# Patient Record
Sex: MALE | Race: WHITE | NOT HISPANIC OR LATINO | Employment: FULL TIME | URBAN - METROPOLITAN AREA
[De-identification: names, ages, dates, MRNs, and addresses within clinical notes are randomized per-mention and may not be internally consistent; named-entity substitution may affect disease eponyms.]

---

## 2017-08-21 ENCOUNTER — HOSPITAL ENCOUNTER (OUTPATIENT)
Dept: LAB | Facility: HOSPITAL | Age: 27
Discharge: HOME | End: 2017-08-21
Attending: FAMILY MEDICINE | Admitting: FAMILY MEDICINE

## 2017-08-21 LAB
ALBUMIN SERPL-MCNC: 4.2 G/DL (ref 3.5–4.8)
ALBUMIN/GLOB SERPL: 1.7 {RATIO} (ref 1–1.7)
ALP SERPL-CCNC: 56 IU/L (ref 32–91)
ALT SERPL-CCNC: 26 IU/L (ref 17–63)
ANION GAP SERPL CALC-SCNC: 12.2 MMOL/L (ref 10–20)
AST SERPL-CCNC: 25 IU/L (ref 15–41)
BASOPHILS # BLD AUTO: 0.1 10*3/UL (ref 0–0.2)
BASOPHILS NFR BLD AUTO: 1 % (ref 0–2)
BILIRUB SERPL-MCNC: 0.5 MG/DL (ref 0.3–1.2)
BUN SERPL-MCNC: 14 MG/DL (ref 8–20)
BUN/CREAT SERPL: 17.5 (ref 6.2–20.3)
CALCIUM SERPL-MCNC: 8.9 MG/DL (ref 8.9–10.3)
CHLORIDE SERPL-SCNC: 106 MMOL/L (ref 101–111)
CONV CO2: 28 MMOL/L (ref 22–32)
CONV TOTAL PROTEIN: 6.7 G/DL (ref 6.1–7.9)
CREAT UR-MCNC: 0.8 MG/DL (ref 0.7–1.2)
DIFFERENTIAL METHOD BLD: (no result)
EOSINOPHIL # BLD AUTO: 0.2 10*3/UL (ref 0–0.3)
EOSINOPHIL # BLD AUTO: 2 % (ref 0–3)
ERYTHROCYTE [DISTWIDTH] IN BLOOD BY AUTOMATED COUNT: 14.2 % (ref 11.5–14.5)
GLOBULIN UR ELPH-MCNC: 2.5 G/DL (ref 2.5–3.8)
GLUCOSE SERPL-MCNC: 88 MG/DL (ref 65–99)
HCT VFR BLD AUTO: 43.1 % (ref 40–54)
HGB BLD-MCNC: 14.6 G/DL (ref 14–18)
LYMPHOCYTES # BLD AUTO: 2 10*3/UL (ref 0.8–4.8)
LYMPHOCYTES NFR BLD AUTO: 28 % (ref 18–42)
MCH RBC QN AUTO: 30.1 PG (ref 26–32)
MCHC RBC AUTO-ENTMCNC: 33.9 G/DL (ref 32–36)
MCV RBC AUTO: 88.6 FL (ref 80–94)
MONOCYTES # BLD AUTO: 0.5 10*3/UL (ref 0.1–1.3)
MONOCYTES NFR BLD AUTO: 7 % (ref 2–11)
NEUTROPHILS # BLD AUTO: 4.6 10*3/UL (ref 2.3–8.6)
NEUTROPHILS NFR BLD AUTO: 62 % (ref 50–75)
NRBC BLD AUTO-RTO: 0 /100{WBCS}
NRBC/RBC NFR BLD MANUAL: 0 10*3/UL
PLATELET # BLD AUTO: 148 10*3/UL (ref 150–450)
PMV BLD AUTO: 10.6 FL (ref 7.4–10.4)
POTASSIUM SERPL-SCNC: 4.2 MMOL/L (ref 3.6–5.1)
RBC # BLD AUTO: 4.87 10*6/UL (ref 4.6–6)
SODIUM SERPL-SCNC: 142 MMOL/L (ref 136–144)
T3 SERPL-MCNC: 1.06 NG/ML (ref 0.87–1.78)
T4 SERPL-MCNC: 6.41 UG/DL (ref 6.1–12.2)
TSH SERPL-ACNC: 0.76 UIU/ML (ref 0.34–5.6)
WBC # BLD AUTO: 7.3 10*3/UL (ref 4.5–11.5)

## 2017-08-22 LAB — T4 FREE SERPL-MCNC: 1.12 NG/DL (ref 0.58–1.64)

## 2019-01-11 ENCOUNTER — HOSPITAL ENCOUNTER (OUTPATIENT)
Dept: RHEUMATOLOGY | Facility: CLINIC | Age: 29
Discharge: HOME | End: 2019-01-11
Attending: INTERNAL MEDICINE | Admitting: INTERNAL MEDICINE

## 2019-01-16 ENCOUNTER — HOSPITAL ENCOUNTER (OUTPATIENT)
Dept: LAB | Facility: HOSPITAL | Age: 29
Discharge: HOME | End: 2019-01-16
Attending: INTERNAL MEDICINE | Admitting: INTERNAL MEDICINE

## 2019-01-16 LAB
ABS VARIANT LYMPHS: 0.24 10*3/UL
ALBUMIN SERPL-MCNC: 3.6 G/DL (ref 3.5–4.8)
ALBUMIN/GLOB SERPL: 1.1 {RATIO} (ref 1–1.7)
ALP SERPL-CCNC: 59 IU/L (ref 32–91)
ALT SERPL-CCNC: 59 IU/L (ref 17–63)
ANION GAP SERPL CALC-SCNC: 13 MMOL/L (ref 10–20)
AST SERPL-CCNC: 43 IU/L (ref 15–41)
BILIRUB SERPL-MCNC: 0.1 MG/DL (ref 0.3–1.2)
BILIRUB UR QL STRIP: NEGATIVE MG/DL
BUN SERPL-MCNC: 21 MG/DL (ref 8–20)
BUN/CREAT SERPL: 23.3 (ref 6.2–20.3)
CALCIUM SERPL-MCNC: 8.4 MG/DL (ref 8.9–10.3)
CASTS URNS QL MICRO: NORMAL /[LPF]
CHLORIDE SERPL-SCNC: 100 MMOL/L (ref 101–111)
COLOR UR: YELLOW
CONV ABS BANDS: 0.1 10*3/UL
CONV BACTERIA IN URINE MICRO: NEGATIVE
CONV CLARITY OF URINE: CLEAR
CONV CO2: 27 MMOL/L (ref 22–32)
CONV HYALINE CASTS IN URINE MICRO: NORMAL /[LPF] (ref 0–5)
CONV PROTEIN IN URINE BY AUTOMATED TEST STRIP: NEGATIVE MG/DL
CONV SMALL ROUND CELLS: NORMAL /[HPF]
CONV TOTAL PROTEIN: 6.8 G/DL (ref 6.1–7.9)
CONV UROBILINOGEN IN URINE BY AUTOMATED TEST STRIP: 0.2 MG/DL
CONV VARIANT LYMPHOCYTES RELATIVE PERCENT BY MANUAL COUNT: 3 % (ref 0–1)
CREAT UR-MCNC: 0.9 MG/DL (ref 0.7–1.2)
CRP SERPL-MCNC: 0.67 MG/DL (ref 0–0.7)
CULTURE INDICATED?: NORMAL
DIFFERENTIAL METHOD BLD: (no result)
EOSINOPHIL # BLD AUTO: 0.3 10*3/UL (ref 0–0.3)
EOSINOPHIL # BLD AUTO: 4 % (ref 0–3)
ERYTHROCYTE [DISTWIDTH] IN BLOOD BY AUTOMATED COUNT: 14.5 % (ref 11.5–14.5)
GLOBULIN UR ELPH-MCNC: 3.2 G/DL (ref 2.5–3.8)
GLUCOSE SERPL-MCNC: 89 MG/DL (ref 65–99)
GLUCOSE UR QL: NEGATIVE MG/DL
HCT VFR BLD AUTO: 44.6 % (ref 40–54)
HGB BLD-MCNC: 15.2 G/DL (ref 14–18)
HGB UR QL STRIP: NEGATIVE
KETONES UR QL STRIP: NEGATIVE MG/DL
LEUKOCYTE ESTERASE UR QL STRIP: NEGATIVE
LYMPHOCYTES # BLD AUTO: 3.2 10*3/UL (ref 0.8–4.8)
LYMPHOCYTES NFR BLD AUTO: 40 % (ref 18–42)
MCH RBC QN AUTO: 29.7 PG (ref 26–32)
MCHC RBC AUTO-ENTMCNC: 34.1 G/DL (ref 32–36)
MCV RBC AUTO: 87.2 FL (ref 80–94)
MONOCYTES # BLD AUTO: 0.7 10*3/UL (ref 0.1–1.3)
MONOCYTES NFR BLD AUTO: 9 % (ref 2–11)
NEUTROPHILS # BLD AUTO: 3.4 10*3/UL (ref 2.3–8.6)
NEUTROPHILS NFR BLD AUTO: 43 % (ref 50–75)
NEUTS BAND NFR BLD MANUAL: 1 % (ref 0–5)
NITRITE UR QL STRIP: NEGATIVE
PH UR STRIP.AUTO: 6 [PH] (ref 4.5–8)
PLATELET # BLD AUTO: 231 10*3/UL (ref 150–450)
PMV BLD AUTO: 9.2 FL (ref 7.4–10.4)
POTASSIUM SERPL-SCNC: 4 MMOL/L (ref 3.6–5.1)
RBC # BLD AUTO: 5.12 10*6/UL (ref 4.6–6)
RBC #/AREA URNS HPF: 0 /[HPF] (ref 0–3)
SODIUM SERPL-SCNC: 136 MMOL/L (ref 136–144)
SP GR UR: 1.01 (ref 1–1.03)
SPERM URNS QL MICRO: NORMAL /[HPF]
SQUAMOUS SPT QL MICRO: 1 /[HPF] (ref 0–5)
UNIDENT CRYS URNS QL MICRO: NORMAL /[HPF]
WBC # BLD AUTO: 7.9 10*3/UL (ref 4.5–11.5)
WBC #/AREA URNS HPF: 2 /[HPF] (ref 0–5)
YEAST SPEC QL WET PREP: NORMAL /[HPF]

## 2019-01-16 PROCEDURE — 86481 TB AG RESPONSE T-CELL SUSP: CPT

## 2019-01-17 ENCOUNTER — LAB REQUISITION (OUTPATIENT)
Dept: LAB | Facility: HOSPITAL | Age: 29
End: 2019-01-17

## 2019-01-17 DIAGNOSIS — Z00.00 ROUTINE GENERAL MEDICAL EXAMINATION AT A HEALTH CARE FACILITY: ICD-10-CM

## 2019-01-17 LAB
HAV IGM SERPL QL IA: NONREACTIVE
HBV CORE IGM SERPL QL IA: NONREACTIVE
HBV SURFACE AG SERPL QL IA: NONREACTIVE
HCV AB SER DONR QL: NORMAL
HCV AB SER DONR QL: NORMAL

## 2019-01-18 LAB
TSPOT INTERPRETATION: NEGATIVE
TSPOT INTERPRETATION: NORMAL
TSPOT NIL CONTROL INTERPRETATION: NORMAL
TSPOT PANEL A: 0
TSPOT PANEL B: 0
TSPOT POS CONTROL INTERPRETATION: NORMAL

## 2019-05-09 ENCOUNTER — HOSPITAL ENCOUNTER (OUTPATIENT)
Dept: INFUSION THERAPY | Facility: HOSPITAL | Age: 29
Discharge: HOME | End: 2019-05-09
Attending: ORAL & MAXILLOFACIAL SURGERY | Admitting: ORAL & MAXILLOFACIAL SURGERY

## 2019-06-27 PROBLEM — E55.9 VITAMIN D DEFICIENCY: Status: ACTIVE | Noted: 2019-02-25

## 2019-06-27 PROBLEM — L03.90 CELLULITIS: Status: ACTIVE | Noted: 2019-01-11

## 2019-06-27 PROBLEM — E03.9 HYPOTHYROIDISM: Status: ACTIVE | Noted: 2019-01-11

## 2019-06-27 PROBLEM — G56.03 CARPAL TUNNEL SYNDROME, BILATERAL: Status: ACTIVE | Noted: 2019-01-11

## 2019-06-27 PROBLEM — M06.9 RHEUMATOID ARTHRITIS OF HAND (HCC): Status: ACTIVE | Noted: 2019-01-11

## 2019-06-27 RX ORDER — LEVOTHYROXINE SODIUM 300 UG/1
TABLET ORAL
COMMUNITY
Start: 2018-11-26

## 2019-06-27 RX ORDER — ATENOLOL 100 MG/1
TABLET ORAL
COMMUNITY
Start: 2018-10-19

## 2019-06-27 RX ORDER — HYDROXYCHLOROQUINE SULFATE 200 MG/1
TABLET, FILM COATED ORAL
COMMUNITY
Start: 2019-02-28 | End: 2019-07-01 | Stop reason: SDUPTHER

## 2019-06-27 RX ORDER — MELOXICAM 15 MG/1
TABLET ORAL EVERY 24 HOURS
COMMUNITY
Start: 2019-03-30 | End: 2019-07-01

## 2019-06-27 RX ORDER — GABAPENTIN 300 MG/1
CAPSULE ORAL
COMMUNITY
Start: 2018-12-26 | End: 2021-07-01

## 2019-06-27 RX ORDER — AZITHROMYCIN 1 G
PACKET (EA) ORAL
COMMUNITY
Start: 2019-01-11 | End: 2021-07-01

## 2019-06-27 RX ORDER — LIOTHYRONINE SODIUM 25 UG/1
TABLET ORAL
COMMUNITY
Start: 2018-11-26

## 2019-07-01 ENCOUNTER — OFFICE VISIT (OUTPATIENT)
Dept: RHEUMATOLOGY | Facility: CLINIC | Age: 29
End: 2019-07-01

## 2019-07-01 ENCOUNTER — LAB (OUTPATIENT)
Dept: LAB | Facility: HOSPITAL | Age: 29
End: 2019-07-01

## 2019-07-01 ENCOUNTER — LAB REQUISITION (OUTPATIENT)
Dept: LAB | Facility: HOSPITAL | Age: 29
End: 2019-07-01

## 2019-07-01 VITALS
DIASTOLIC BLOOD PRESSURE: 80 MMHG | WEIGHT: 315 LBS | HEIGHT: 76 IN | SYSTOLIC BLOOD PRESSURE: 142 MMHG | HEART RATE: 70 BPM | BODY MASS INDEX: 38.36 KG/M2

## 2019-07-01 DIAGNOSIS — R20.0 BILATERAL HAND NUMBNESS: ICD-10-CM

## 2019-07-01 DIAGNOSIS — M06.09 RHEUMATOID ARTHRITIS OF MULTIPLE SITES WITHOUT RHEUMATOID FACTOR (HCC): ICD-10-CM

## 2019-07-01 DIAGNOSIS — E03.9 HYPOTHYROIDISM, UNSPECIFIED TYPE: ICD-10-CM

## 2019-07-01 DIAGNOSIS — M05.79 RHEUMATOID ARTHRITIS INVOLVING MULTIPLE SITES WITH POSITIVE RHEUMATOID FACTOR (HCC): ICD-10-CM

## 2019-07-01 DIAGNOSIS — E55.9 VITAMIN D DEFICIENCY: ICD-10-CM

## 2019-07-01 LAB
25(OH)D3 SERPL-MCNC: 31 NG/ML (ref 30–100)
ALBUMIN SERPL-MCNC: 4.1 G/DL (ref 3.5–4.8)
ALBUMIN/GLOB SERPL: 1.6 G/DL (ref 1–1.7)
ALP SERPL-CCNC: 51 U/L (ref 32–91)
ALT SERPL W P-5'-P-CCNC: 37 U/L (ref 17–63)
ANION GAP SERPL CALCULATED.3IONS-SCNC: 15.5 MMOL/L (ref 10–20)
AST SERPL-CCNC: 28 U/L (ref 15–41)
BASOPHILS # BLD AUTO: 0 10*3/MM3 (ref 0–0.2)
BASOPHILS NFR BLD AUTO: 0.6 % (ref 0–1.5)
BILIRUB SERPL-MCNC: 0.4 MG/DL (ref 0.3–1.2)
BUN BLD-MCNC: 15 MG/DL (ref 8–20)
BUN/CREAT SERPL: 21.4 (ref 6.2–20.3)
CALCIUM SPEC-SCNC: 8.4 MG/DL (ref 8.9–10.3)
CHLORIDE SERPL-SCNC: 103 MMOL/L (ref 101–111)
CO2 SERPL-SCNC: 25 MMOL/L (ref 22–32)
CREAT BLD-MCNC: 0.7 MG/DL (ref 0.7–1.2)
CRP SERPL-MCNC: 0.23 MG/DL (ref 0–0.7)
CYCLIC CITRULLINATED PEPTIDE ANTIBODY: < 0.4
DEPRECATED RDW RBC AUTO: 45.5 FL (ref 37–54)
EOSINOPHIL # BLD AUTO: 0.2 10*3/MM3 (ref 0–0.4)
EOSINOPHIL NFR BLD AUTO: 2.5 % (ref 0.3–6.2)
ERYTHROCYTE [DISTWIDTH] IN BLOOD BY AUTOMATED COUNT: 14.5 % (ref 12.3–15.4)
ERYTHROCYTE [SEDIMENTATION RATE] IN BLOOD: 13 MM/HR (ref 0–15)
GFR SERPL CREATININE-BSD FRML MDRD: 132 ML/MIN/1.73
GLOBULIN UR ELPH-MCNC: 2.5 GM/DL (ref 2.5–3.8)
GLUCOSE BLD-MCNC: 98 MG/DL (ref 65–99)
HCT VFR BLD AUTO: 44.4 % (ref 37.5–51)
HGB BLD-MCNC: 14.9 G/DL (ref 13–17.7)
LYMPHOCYTES # BLD AUTO: 1.8 10*3/MM3 (ref 0.7–3.1)
LYMPHOCYTES NFR BLD AUTO: 29 % (ref 19.6–45.3)
MCH RBC QN AUTO: 30.2 PG (ref 26.6–33)
MCHC RBC AUTO-ENTMCNC: 33.5 G/DL (ref 31.5–35.7)
MCV RBC AUTO: 90.3 FL (ref 79–97)
MONOCYTES # BLD AUTO: 0.4 10*3/MM3 (ref 0.1–0.9)
MONOCYTES NFR BLD AUTO: 7.1 % (ref 5–12)
NEUTROPHILS # BLD AUTO: 3.8 10*3/MM3 (ref 1.7–7)
NEUTROPHILS NFR BLD AUTO: 60.8 % (ref 42.7–76)
PLATELET # BLD AUTO: 168 10*3/MM3 (ref 140–450)
PMV BLD AUTO: 9.8 FL (ref 6–12)
POTASSIUM BLD-SCNC: 4.5 MMOL/L (ref 3.6–5.1)
PROT SERPL-MCNC: 6.6 G/DL (ref 6.1–7.9)
RBC # BLD AUTO: 4.92 10*6/MM3 (ref 4.14–5.8)
SODIUM BLD-SCNC: 139 MMOL/L (ref 136–144)
TSH SERPL DL<=0.05 MIU/L-ACNC: 11.33 MIU/ML (ref 0.34–5.6)
URATE SERPL-MCNC: 6.1 MG/DL (ref 4.8–8.7)
WBC NRBC COR # BLD: 6.2 10*3/MM3 (ref 3.4–10.8)

## 2019-07-01 PROCEDURE — 36415 COLL VENOUS BLD VENIPUNCTURE: CPT

## 2019-07-01 PROCEDURE — 85652 RBC SED RATE AUTOMATED: CPT | Performed by: NURSE PRACTITIONER

## 2019-07-01 PROCEDURE — 82306 VITAMIN D 25 HYDROXY: CPT | Performed by: NURSE PRACTITIONER

## 2019-07-01 PROCEDURE — 86200 CCP ANTIBODY: CPT | Performed by: NURSE PRACTITIONER

## 2019-07-01 PROCEDURE — 85027 COMPLETE CBC AUTOMATED: CPT | Performed by: NURSE PRACTITIONER

## 2019-07-01 PROCEDURE — 84550 ASSAY OF BLOOD/URIC ACID: CPT | Performed by: NURSE PRACTITIONER

## 2019-07-01 PROCEDURE — 99214 OFFICE O/P EST MOD 30 MIN: CPT | Performed by: NURSE PRACTITIONER

## 2019-07-01 PROCEDURE — 80053 COMPREHEN METABOLIC PANEL: CPT | Performed by: NURSE PRACTITIONER

## 2019-07-01 PROCEDURE — 84443 ASSAY THYROID STIM HORMONE: CPT | Performed by: NURSE PRACTITIONER

## 2019-07-01 PROCEDURE — 86431 RHEUMATOID FACTOR QUANT: CPT | Performed by: NURSE PRACTITIONER

## 2019-07-01 PROCEDURE — 86140 C-REACTIVE PROTEIN: CPT | Performed by: NURSE PRACTITIONER

## 2019-07-01 RX ORDER — HYDROXYCHLOROQUINE SULFATE 200 MG/1
200 TABLET, FILM COATED ORAL 2 TIMES DAILY
Qty: 180 TABLET | Refills: 1 | Status: SHIPPED | OUTPATIENT
Start: 2019-07-01 | End: 2019-07-23 | Stop reason: SDUPTHER

## 2019-07-01 RX ORDER — DICLOFENAC SODIUM 75 MG/1
75 TABLET, DELAYED RELEASE ORAL 2 TIMES DAILY
Qty: 60 TABLET | Refills: 0 | Status: SHIPPED | OUTPATIENT
Start: 2019-07-01 | End: 2019-09-06 | Stop reason: SDUPTHER

## 2019-07-01 NOTE — PROGRESS NOTES
Subjective   Santhosh Bonilla is a 28 y.o. male.     History of Present Illness  Pt is here for follow up today. He has history of seropositive RA, carpal tunnel syndrome, vitamin D deficiency, hypocalcemia and hypothyroidism. Pt was last seen in the office on 2-25-19 and is here for follow up today.  Labs in January 2019 showed a (-) t-spot, normal vitamin D level, low calcium at 8.4 (8.9-10.3), CBC showed no leukopenia, anemia or thrombocytopenia.    Last hand xray was in January 2019 & showed no evidence of inflammatory arthritis.     Current medications from Rheumatology include: plaquenil 200mg/d, meloxicam 15 mg/d...feels like meds are not helping much. Having pain and stiffness in hands, feet and shoulders. Stiffness lasts 2 hours, hands swell. Hands seem to bother him the most. The numbness is worsening (rt worse than left)he is taking vitamin B6 and reports no change or improvement in symptoms. Works with his hands all day & his ADLs can be difficult.    He is taking gabapentin 300 TID per PCP for his pain. PCP manages his thyroid disease, reports he is due for lab check.        Rheumatological history: Seropositive RA--> on plaquenil & meloxicam  Offered arava, pt did not want to try d/t concern w/ side effects.    The following portions of the patient's history were reviewed and updated as appropriate: allergies, current medications, past family history, past medical history, past social history, past surgical history and problem list.    Review of Systems   Constitutional: Negative for chills and fever.   HENT: Negative for mouth sores.    Eyes: Negative for pain.        Denies dry eyes   Respiratory: Negative for chest tightness and shortness of breath.    Cardiovascular: Negative for chest pain.   Musculoskeletal: Positive for arthralgias, back pain and joint swelling.   Skin: Negative for color change and rash.       Objective   Physical Exam   Constitutional: He is oriented to person, place, and  time.   obese   HENT:   Head: Normocephalic and atraumatic.   Eyes: Conjunctivae and EOM are normal. Pupils are equal, round, and reactive to light.   Neck: Normal range of motion.   Cardiovascular: Normal rate and regular rhythm.   Pulmonary/Chest: Effort normal and breath sounds normal. He has no wheezes.   Musculoskeletal:   Decreased ROM to the lumbar spine, bilateral hips, MCPs  He has tenderness over the bilateral MCPs, 1st CMC joint, bilateral PIPs, right great toe.   Mild swelling is noted over the bilateral 2nd/3rd MCP   Neurological: He is alert and oriented to person, place, and time.   Skin: Skin is warm and dry. No rash noted.   Psychiatric: He has a normal mood and affect. His behavior is normal.         Assessment/Plan   Santhosh was seen today for joint pain.    Diagnoses and all orders for this visit:    Rheumatoid arthritis involving multiple sites with positive rheumatoid factor (CMS/HCC)  Comments:  Increased joint pain in the hands & feet  Will repeat hand xray today, order bilateral foot xray  Increase plaquenil to 400mg/d, start diclofenac 75 BID  labs  Orders:  -     CBC With Manual Differential; Future  -     Comprehensive Metabolic Panel; Future  -     C-reactive Protein; Future  -     Cyclic Citrul Peptide Antibody, IgG / IgA; Future  -     Vitamin D 25 Hydroxy; Future  -     Sedimentation Rate; Future  -     Rheumatoid Factor; Future  -     Vectra(R) DA Disease Activity; Future  -     XR Hand 2 View Bilateral  -     XR Foot 3+ View Bilateral; Future  -     EMG 2 Limbs; Future  -     Uric Acid; Future    Bilateral hand numbness  Comments:  Continue B6  EMG of the bilateral upper ext.  Orders:  -     CBC With Manual Differential; Future  -     Comprehensive Metabolic Panel; Future  -     C-reactive Protein; Future  -     Cyclic Citrul Peptide Antibody, IgG / IgA; Future  -     Vitamin D 25 Hydroxy; Future  -     Sedimentation Rate; Future  -     Rheumatoid Factor; Future  -     Vectra(R)  DA Disease Activity; Future  -     XR Hand 2 View Bilateral  -     XR Foot 3+ View Bilateral; Future  -     EMG 2 Limbs; Future    Hypothyroidism, unspecified type  Comments:  Pt currently on levothyroxine  Labs today to include TSH--> will fax results to PCP  Orders:  -     CBC With Manual Differential; Future  -     Comprehensive Metabolic Panel; Future  -     C-reactive Protein; Future  -     Cyclic Citrul Peptide Antibody, IgG / IgA; Future  -     Vitamin D 25 Hydroxy; Future  -     Sedimentation Rate; Future  -     Rheumatoid Factor; Future  -     Vectra(R) DA Disease Activity; Future  -     XR Hand 2 View Bilateral  -     XR Foot 3+ View Bilateral; Future  -     EMG 2 Limbs; Future  -     TSH; Future    Vitamin D deficiency  Comments:  Check vitamin D level today  Orders:  -     CBC With Manual Differential; Future  -     Comprehensive Metabolic Panel; Future  -     C-reactive Protein; Future  -     Cyclic Citrul Peptide Antibody, IgG / IgA; Future  -     Vitamin D 25 Hydroxy; Future  -     Sedimentation Rate; Future  -     Rheumatoid Factor; Future  -     Vectra(R) DA Disease Activity; Future  -     XR Hand 2 View Bilateral  -     XR Foot 3+ View Bilateral; Future  -     EMG 2 Limbs; Future

## 2019-07-01 NOTE — PATIENT INSTRUCTIONS
Follow up in 3 months  Hand/ feet xray today   Labs + vectra DA today, EMG bilateral upper ext.  Stop meloxicam--> will start diclofenac 75 mg po BID as needed  Increase plaquenil to 400mg/d    Discussed the importance of eye examination with plaquenil use. Pt verbalizes understanding.

## 2019-07-03 LAB — CHROMATIN AB SERPL-ACNC: <20 IU/ML (ref 0–20)

## 2019-07-21 ENCOUNTER — TELEPHONE (OUTPATIENT)
Dept: RHEUMATOLOGY | Facility: CLINIC | Age: 29
End: 2019-07-21

## 2019-07-21 DIAGNOSIS — M05.79 RHEUMATOID ARTHRITIS INVOLVING MULTIPLE SITES WITH POSITIVE RHEUMATOID FACTOR (HCC): ICD-10-CM

## 2019-07-21 NOTE — TELEPHONE ENCOUNTER
Vectra is moderate. Would see how pt does with increase in plaquenil. It may take a month or two to see changes or improvement. If no changes in symptoms. Pt may let me know.

## 2019-07-23 RX ORDER — HYDROXYCHLOROQUINE SULFATE 200 MG/1
200 TABLET, FILM COATED ORAL 2 TIMES DAILY
Qty: 180 TABLET | Refills: 0 | Status: SHIPPED | OUTPATIENT
Start: 2019-07-23 | End: 2019-10-23 | Stop reason: SDUPTHER

## 2019-08-02 ENCOUNTER — HOSPITAL ENCOUNTER (OUTPATIENT)
Dept: NEUROLOGY | Facility: HOSPITAL | Age: 29
Discharge: HOME | End: 2019-08-02
Admitting: NURSE PRACTITIONER

## 2019-08-02 DIAGNOSIS — R20.0 BILATERAL HAND NUMBNESS: ICD-10-CM

## 2019-08-02 DIAGNOSIS — E03.9 HYPOTHYROIDISM, UNSPECIFIED TYPE: ICD-10-CM

## 2019-08-02 DIAGNOSIS — M05.79 RHEUMATOID ARTHRITIS INVOLVING MULTIPLE SITES WITH POSITIVE RHEUMATOID FACTOR (HCC): ICD-10-CM

## 2019-08-02 DIAGNOSIS — E55.9 VITAMIN D DEFICIENCY: ICD-10-CM

## 2019-08-02 PROCEDURE — 95910 NRV CNDJ TEST 7-8 STUDIES: CPT

## 2019-08-02 PROCEDURE — 95910 NRV CNDJ TEST 7-8 STUDIES: CPT | Performed by: PSYCHIATRY & NEUROLOGY

## 2019-08-02 PROCEDURE — 95886 MUSC TEST DONE W/N TEST COMP: CPT

## 2019-08-02 PROCEDURE — 95886 MUSC TEST DONE W/N TEST COMP: CPT | Performed by: PSYCHIATRY & NEUROLOGY

## 2019-08-02 NOTE — PROCEDURES
EMG REPORT    Indication: Hand numbness    Findings: Right median sensory latency is prolonged at 4.85 ms.  Left median and bilateral ulnar sensory study showed normal latencies and amplitudes.  Right median motor study showed a prolonged distal latency of 6.85 ms with normal velocity and amplitude.  Left median motor study showed a distal latency of 4.05 ms with normal velocity and amplitude.  Bilateral ulnar motor study showed normal distal latencies and amplitudes.    Concentric needle EMG of bilateral biceps, triceps, brachioradialis, extensor digitorum, and first dorsal interosseous muscles showed no abnormality.    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  The mL he is severe in degree and localizes best at or near the carpal tunnel region.  Needle EMG of both upper extremities failed to show evidence of superimposed cervical radiculopathy.       Jameson Cruz M.D.

## 2019-09-06 DIAGNOSIS — M05.79 RHEUMATOID ARTHRITIS INVOLVING MULTIPLE SITES WITH POSITIVE RHEUMATOID FACTOR (HCC): ICD-10-CM

## 2019-09-06 RX ORDER — DICLOFENAC SODIUM 75 MG/1
TABLET, DELAYED RELEASE ORAL
Qty: 60 TABLET | Refills: 2 | Status: SHIPPED | OUTPATIENT
Start: 2019-09-06 | End: 2019-10-21

## 2019-10-21 ENCOUNTER — OFFICE VISIT (OUTPATIENT)
Dept: RHEUMATOLOGY | Facility: CLINIC | Age: 29
End: 2019-10-21

## 2019-10-21 ENCOUNTER — LAB (OUTPATIENT)
Dept: LAB | Facility: HOSPITAL | Age: 29
End: 2019-10-21

## 2019-10-21 VITALS
SYSTOLIC BLOOD PRESSURE: 134 MMHG | DIASTOLIC BLOOD PRESSURE: 80 MMHG | BODY MASS INDEX: 37.19 KG/M2 | HEIGHT: 77 IN | WEIGHT: 315 LBS | HEART RATE: 68 BPM

## 2019-10-21 DIAGNOSIS — M06.9 RHEUMATOID ARTHRITIS INVOLVING BOTH HANDS, UNSPECIFIED RHEUMATOID FACTOR PRESENCE: ICD-10-CM

## 2019-10-21 DIAGNOSIS — G56.03 CARPAL TUNNEL SYNDROME, BILATERAL: ICD-10-CM

## 2019-10-21 DIAGNOSIS — E55.9 VITAMIN D DEFICIENCY: Primary | ICD-10-CM

## 2019-10-21 LAB
25(OH)D3 SERPL-MCNC: 26 NG/ML (ref 30–100)
ALBUMIN SERPL-MCNC: 4.1 G/DL (ref 3.5–5.2)
ALBUMIN/GLOB SERPL: 1.3 G/DL
ALP SERPL-CCNC: 51 U/L (ref 39–117)
ALT SERPL W P-5'-P-CCNC: 44 U/L (ref 1–41)
ANION GAP SERPL CALCULATED.3IONS-SCNC: 7.8 MMOL/L (ref 5–15)
AST SERPL-CCNC: 27 U/L (ref 1–40)
BASOPHILS # BLD AUTO: 0.04 10*3/MM3 (ref 0–0.2)
BASOPHILS NFR BLD AUTO: 0.7 % (ref 0–1.5)
BILIRUB SERPL-MCNC: 0.3 MG/DL (ref 0.2–1.2)
BUN BLD-MCNC: 11 MG/DL (ref 6–20)
BUN/CREAT SERPL: 15.3 (ref 7–25)
CALCIUM SPEC-SCNC: 8.7 MG/DL (ref 8.6–10.5)
CHLORIDE SERPL-SCNC: 102 MMOL/L (ref 98–107)
CHROMATIN AB SERPL-ACNC: 15.8 IU/ML (ref 0–14)
CO2 SERPL-SCNC: 30.2 MMOL/L (ref 22–29)
CREAT BLD-MCNC: 0.72 MG/DL (ref 0.76–1.27)
CRP SERPL-MCNC: 0.16 MG/DL (ref 0–0.5)
DEPRECATED RDW RBC AUTO: 43.8 FL (ref 37–54)
EOSINOPHIL # BLD AUTO: 0.23 10*3/MM3 (ref 0–0.4)
EOSINOPHIL NFR BLD AUTO: 3.9 % (ref 0.3–6.2)
ERYTHROCYTE [DISTWIDTH] IN BLOOD BY AUTOMATED COUNT: 13.5 % (ref 12.3–15.4)
ERYTHROCYTE [SEDIMENTATION RATE] IN BLOOD: 8 MM/HR (ref 0–15)
GFR SERPL CREATININE-BSD FRML MDRD: 127 ML/MIN/1.73
GLOBULIN UR ELPH-MCNC: 3.1 GM/DL
GLUCOSE BLD-MCNC: 101 MG/DL (ref 65–99)
HCT VFR BLD AUTO: 42.2 % (ref 37.5–51)
HGB BLD-MCNC: 14.2 G/DL (ref 13–17.7)
IMM GRANULOCYTES # BLD AUTO: 0.04 10*3/MM3 (ref 0–0.05)
IMM GRANULOCYTES NFR BLD AUTO: 0.7 % (ref 0–0.5)
LYMPHOCYTES # BLD AUTO: 2.01 10*3/MM3 (ref 0.7–3.1)
LYMPHOCYTES NFR BLD AUTO: 34.1 % (ref 19.6–45.3)
MCH RBC QN AUTO: 30 PG (ref 26.6–33)
MCHC RBC AUTO-ENTMCNC: 33.6 G/DL (ref 31.5–35.7)
MCV RBC AUTO: 89 FL (ref 79–97)
MONOCYTES # BLD AUTO: 0.39 10*3/MM3 (ref 0.1–0.9)
MONOCYTES NFR BLD AUTO: 6.6 % (ref 5–12)
NEUTROPHILS # BLD AUTO: 3.18 10*3/MM3 (ref 1.7–7)
NEUTROPHILS NFR BLD AUTO: 54 % (ref 42.7–76)
NRBC BLD AUTO-RTO: 0 /100 WBC (ref 0–0.2)
PLATELET # BLD AUTO: 188 10*3/MM3 (ref 140–450)
PMV BLD AUTO: 11.5 FL (ref 6–12)
POTASSIUM BLD-SCNC: 4.3 MMOL/L (ref 3.5–5.2)
PROT SERPL-MCNC: 7.2 G/DL (ref 6–8.5)
RBC # BLD AUTO: 4.74 10*6/MM3 (ref 4.14–5.8)
SODIUM BLD-SCNC: 140 MMOL/L (ref 136–145)
WBC NRBC COR # BLD: 5.89 10*3/MM3 (ref 3.4–10.8)

## 2019-10-21 PROCEDURE — 99213 OFFICE O/P EST LOW 20 MIN: CPT | Performed by: NURSE PRACTITIONER

## 2019-10-21 PROCEDURE — 84165 PROTEIN E-PHORESIS SERUM: CPT

## 2019-10-21 PROCEDURE — 36415 COLL VENOUS BLD VENIPUNCTURE: CPT

## 2019-10-21 PROCEDURE — 85652 RBC SED RATE AUTOMATED: CPT

## 2019-10-21 PROCEDURE — 86140 C-REACTIVE PROTEIN: CPT

## 2019-10-21 PROCEDURE — 80053 COMPREHEN METABOLIC PANEL: CPT

## 2019-10-21 PROCEDURE — 86431 RHEUMATOID FACTOR QUANT: CPT

## 2019-10-21 PROCEDURE — 85025 COMPLETE CBC W/AUTO DIFF WBC: CPT

## 2019-10-21 PROCEDURE — 86200 CCP ANTIBODY: CPT

## 2019-10-21 PROCEDURE — 82306 VITAMIN D 25 HYDROXY: CPT

## 2019-10-21 RX ORDER — LEVOTHYROXINE SODIUM 0.03 MG/1
25 TABLET ORAL DAILY
Refills: 1 | COMMUNITY
Start: 2019-08-12

## 2019-10-21 RX ORDER — ETODOLAC 400 MG/1
400 TABLET, FILM COATED ORAL 2 TIMES DAILY
Qty: 60 TABLET | Refills: 1 | Status: SHIPPED | OUTPATIENT
Start: 2019-10-21 | End: 2020-01-13

## 2019-10-21 NOTE — PATIENT INSTRUCTIONS
Labs today  Stop diclofenac, start etodolac 400 mg po BID  PRN. Take w/ food.  Discussed holding plaquenil until having eyes examined & then update us.  RTO in 3-4 months or sooner if needed.

## 2019-10-21 NOTE — PROGRESS NOTES
Subjective   Santhosh Bonilla is a 29 y.o. male.     History of Present Illness     Pt is here for follow up today. He has history of seropositive RA, carpal tunnel syndrome, vitamin D deficiency,and hypothyroidism. Pt was last seen in the office in July 2019 and is here for follow up today.  Labs in July 2019 showed normal CRP, ESR & vit D level. Vectra DA was 35     Last hand xray was in January 2019 & showed no evidence of inflammatory arthritis.      Current medications from Rheumatology include: plaquenil 400mg/d, diclofenac 75 mg po BID .feels like meds are not helping much. Increased plaquenil in July and started diclofenac and reports no change in symptoms. Going to have eyes checked because he is seeing floaters.    Having pain and stiffness in hands, feet and shoulders. Had EMG & showed CTS rt > lt. Will have release surgery on Nov. 7th to the right wrist at [facility]. Left is not as severe according to pt & he plans to hold off on surgery if possible to the left.      Stiffness lasts 2 hours, hands swell. Hands seem to bother him the most, also has pain in both knees, feet and shoulders. Sometimes the hands will swell, this is intermittently.    ROS: denies fever/chills, no nasal or oral lesions. Denies N/V/D. NO CP or SOA, no skin rashes or PS. Has hypothyroidism and his thyroid med was recently adjusted by PCP. Brain fog is getting better since adjustment. The remainder of the review of systems reviewed and are (-).   He is taking gabapentin 300 TID per PCP for his pain. He has history of bulging disc & did see spin in the past. Tried injections and they did not help.    Rheadrianne hx: seropositive RA, on plaquenil 400 mg/d  Meloxicam, diclofenac did not help  Discussed arava---> pt declines d/t concerns with side effects.             Increased plaquenil after last offce visit.   Labs in July 2019 showed normal CRP, ESR and vitamin D, vectra was 35.       The following portions of the patient's  history were reviewed and updated as appropriate: allergies, current medications, past family history, past medical history, past social history, past surgical history and problem list.    Review of Systems   Constitutional:        See HPI       Objective   Physical Exam   Constitutional: He is oriented to person, place, and time. He appears well-developed.   obese   HENT:   Head: Normocephalic and atraumatic.   Eyes: Conjunctivae are normal.   Cardiovascular: Normal rate.   Pulmonary/Chest: Effort normal and breath sounds normal.   Musculoskeletal:   Decreased ROM over the lumbar spine, bilateral hips, knees, shoulders  & michaela MCPs. He has mild squeeze tenderness noted bilaterally. HE has tenderness over the lumbar spine, michaela MCPs, PIPs, and michaela knees and ankles  Ankles are mildly swollen.    Neurological: He is alert and oriented to person, place, and time.   Skin: Skin is warm and dry.   Vitals reviewed.        Assessment/Plan   Santhosh was seen today for rheumatoid arthritis.    Diagnoses and all orders for this visit:    Vitamin D deficiency  Comments:  Check vit D    Rheumatoid arthritis involving both hands, unspecified rheumatoid factor presence (CMS/[facility])  Comments:  Hold plaqueni until eye exam & call w/ update  Stop diclofenac, start etodolac 400 mg po BID w/ food.  Check labs today.  Orders:  -     CBC & Differential; Future  -     Comprehensive Metabolic Panel; Future  -     C-reactive Protein; Future  -     Sedimentation Rate; Future  -     Rheumatoid Factor; Future  -     Cyclic Citrul Peptide Antibody, IgG / IgA; Future  -     Vitamin D 25 Hydroxy; Future  -     Protein Elec + Interp, Serum; Future    Carpal tunnel syndrome, bilateral  Comments:  Pt planning surgery for Nov. 7th (Rt) per [facility]  left not as severe & pt plans to hold off on this.  Orders:  -     Vitamin D 25 Hydroxy; Future    Other orders  -     etodolac (LODINE) 400 MG tablet; Take 1 tablet by mouth 2 (Two) Times a  Day.        Patient Instructions   Labs today  Stop diclofenac, start etodolac 400 mg po BID  PRN. Take w/ food.  Discussed holding plaquenil until having eyes examined & then update us.  RTO in 3-4 months or sooner if needed.

## 2019-10-22 LAB
ALBUMIN SERPL-MCNC: 3.7 G/DL (ref 2.9–4.4)
ALBUMIN/GLOB SERPL: 1.3 {RATIO} (ref 0.7–1.7)
ALPHA1 GLOB FLD ELPH-MCNC: 0.2 G/DL (ref 0–0.4)
ALPHA2 GLOB SERPL ELPH-MCNC: 0.6 G/DL (ref 0.4–1)
B-GLOBULIN SERPL ELPH-MCNC: 1 G/DL (ref 0.7–1.3)
CCP IGA+IGG SERPL IA-ACNC: 11 UNITS (ref 0–19)
GAMMA GLOB SERPL ELPH-MCNC: 0.9 G/DL (ref 0.4–1.8)
GLOBULIN SER CALC-MCNC: 2.8 G/DL (ref 2.2–3.9)
Lab: NORMAL
M-SPIKE: NORMAL G/DL
PROT PATTERN SERPL ELPH-IMP: NORMAL
PROT SERPL-MCNC: 6.5 G/DL (ref 6–8.5)

## 2019-10-23 DIAGNOSIS — M05.79 RHEUMATOID ARTHRITIS INVOLVING MULTIPLE SITES WITH POSITIVE RHEUMATOID FACTOR (HCC): ICD-10-CM

## 2019-10-24 RX ORDER — HYDROXYCHLOROQUINE SULFATE 200 MG/1
TABLET, FILM COATED ORAL
Qty: 180 TABLET | Refills: 0 | Status: SHIPPED | OUTPATIENT
Start: 2019-10-24 | End: 2020-01-28 | Stop reason: SDUPTHER

## 2020-01-13 RX ORDER — ETODOLAC 400 MG/1
TABLET, FILM COATED ORAL
Qty: 60 TABLET | Refills: 0 | Status: SHIPPED | OUTPATIENT
Start: 2020-01-13 | End: 2020-01-28 | Stop reason: SDUPTHER

## 2020-01-13 NOTE — TELEPHONE ENCOUNTER
Last seen 10/21/2019: Instructions         Return in about 3 months (around 1/21/2020).   Labs today  Stop diclofenac, start etodolac 400 mg po BID  PRN. Take w/ food.  Discussed holding plaquenil until having eyes examined & then update us.  RTO in 3-4 months or sooner if needed.            Scheduled 1/28/20

## 2020-01-28 ENCOUNTER — OFFICE VISIT (OUTPATIENT)
Dept: RHEUMATOLOGY | Facility: CLINIC | Age: 30
End: 2020-01-28

## 2020-01-28 ENCOUNTER — LAB REQUISITION (OUTPATIENT)
Dept: LAB | Facility: HOSPITAL | Age: 30
End: 2020-01-28

## 2020-01-28 VITALS
WEIGHT: 315 LBS | SYSTOLIC BLOOD PRESSURE: 150 MMHG | BODY MASS INDEX: 37.19 KG/M2 | HEART RATE: 73 BPM | DIASTOLIC BLOOD PRESSURE: 86 MMHG | HEIGHT: 77 IN

## 2020-01-28 DIAGNOSIS — E55.9 VITAMIN D DEFICIENCY: ICD-10-CM

## 2020-01-28 DIAGNOSIS — E66.01 CLASS 3 SEVERE OBESITY WITH BODY MASS INDEX (BMI) OF 50.0 TO 59.9 IN ADULT, UNSPECIFIED OBESITY TYPE, UNSPECIFIED WHETHER SERIOUS COMORBIDITY PRESENT (HCC): Primary | ICD-10-CM

## 2020-01-28 DIAGNOSIS — M06.09 RHEUMATOID ARTHRITIS WITHOUT RHEUMATOID FACTOR, MULTIPLE SITES (HCC): ICD-10-CM

## 2020-01-28 DIAGNOSIS — M05.79 RHEUMATOID ARTHRITIS INVOLVING MULTIPLE SITES WITH POSITIVE RHEUMATOID FACTOR (HCC): ICD-10-CM

## 2020-01-28 DIAGNOSIS — G56.03 CARPAL TUNNEL SYNDROME, BILATERAL: ICD-10-CM

## 2020-01-28 PROCEDURE — 36415 COLL VENOUS BLD VENIPUNCTURE: CPT | Performed by: INTERNAL MEDICINE

## 2020-01-28 PROCEDURE — 99214 OFFICE O/P EST MOD 30 MIN: CPT | Performed by: NURSE PRACTITIONER

## 2020-01-28 RX ORDER — VITAMIN B COMPLEX
1 CAPSULE ORAL DAILY
COMMUNITY
End: 2021-07-01

## 2020-01-28 RX ORDER — HYDROXYCHLOROQUINE SULFATE 200 MG/1
200 TABLET, FILM COATED ORAL 2 TIMES DAILY
Qty: 180 TABLET | Refills: 0 | Status: SHIPPED | OUTPATIENT
Start: 2020-01-28 | End: 2021-07-01

## 2020-01-28 RX ORDER — ETODOLAC 400 MG/1
400 TABLET, FILM COATED ORAL 2 TIMES DAILY
Qty: 180 TABLET | Refills: 0 | Status: SHIPPED | OUTPATIENT
Start: 2020-01-28 | End: 2021-07-01

## 2020-01-28 NOTE — PROGRESS NOTES
Subjective   Santhosh Bonilla is a 29 y.o. male.     History of Present Illness     Pt is a 29 yr old male who is here for follow up today for his seropositive rheumatoid arthritis (RA), carpal tunnel syndrome (CTS), vitamin D deficiency,and hypothyroidism. Pt was last seen in the office in October 2019 and is here for follow up today.  Labs in October showed low vitamin D at 26, ESR & CRP both normal, CBC unremarkable. At the time of his last office visit he was discussing plans to have carpal tunnel surgery (Rt wrist), has CTS to left wrist as well but reports the right was worse.    July 2019: Vectra DA was 35     Last hand xray was in January 2019 & showed no evidence of inflammatory arthritis.      Current medications from Rheumatology include: plaquenil 400mg/d, diclofenac 75 mg po BID .feels like meds are not helping much. Increased plaquenil in July and started diclofenac and reports no change in symptoms. Going to have eyes checked because he is seeing floaters.      Stiffness lasts 2 hours, hands swell. Hands seem to bother him the most, also has pain in both knees, feet and shoulders. Sometimes the hands will swell, this is intermittently.        ROS: denies fever/chills, no nasal or oral lesions. Denies N/V/D. NO CP or SOA, no skin rashes or PS. Has hypothyroidism and his thyroid med was recently adjusted by PCP. Brain fog is getting better since adjustment. The remainder of the review of systems reviewed and are (-).   He is taking gabapentin 300 TID per PCP for his pain. He has history of bulging disc & did see spin in the past. Tried injections and they did not help.     Rhem hx: seropositive RA, on plaquenil 400 mg/d  Meloxicam, diclofenac did not help  Discussed arava---> pt declines d/t concerns with side effects.               The following portions of the patient's history were reviewed and updated as appropriate: allergies, current medications, past family history, past medical history, past  social history, past surgical history and problem list.    Review of Systems  See HPI    Objective   Physical Exam   Constitutional: He appears well-developed and well-nourished.   HENT:   Head: Normocephalic.   Nose: Nose normal.   Cardiovascular: Normal rate and regular rhythm.   Pulmonary/Chest: Effort normal and breath sounds normal.   Skin: Skin is warm and dry.   Vitals reviewed.        Assessment/Plan   Santhosh was seen today for rheumatoid arthritis.    Diagnoses and all orders for this visit:    Class 3 severe obesity with body mass index (BMI) of 50.0 to 59.9 in adult, unspecified obesity type, unspecified whether serious comorbidity present (CMS/[facility])    Rheumatoid arthritis involving multiple sites with positive rheumatoid factor (CMS/[facility])  Comments:  May continue w/ plaquenil and etodolac.  Multiple tender joints on examination. Labs today + vectra DA  Orders:  -     CBC & Differential; Future  -     Comprehensive Metabolic Panel; Future  -     C-reactive Protein; Future  -     Cyclic Citrul Peptide Antibody, IgG / IgA; Future  -     Vitamin D 25 Hydroxy; Future  -     Vectra(R) DA Disease Activity; Future  -     etodolac (LODINE) 400 MG tablet; Take 1 tablet by mouth 2 (Two) Times a Day.  -     hydroxychloroquine (PLAQUENIL) 200 MG tablet; Take 1 tablet by mouth 2 (Two) Times a Day.    Vitamin D deficiency  Comments:  Check labs  Orders:  -     Vitamin D 25 Hydroxy; Future    Carpal tunnel syndrome, bilateral  Comments:  S/P right carpal tunnel surgery in November 2019--claims he is doing well        Patient Instructions   Pt has multiple tender joints on examination today. No synovitis is noted on examination.  Labs today + vectra  Discussed therapy; consider methotrexate  RTO 2-3 month

## 2020-01-28 NOTE — PATIENT INSTRUCTIONS
Pt has multiple tender joints on examination today. No synovitis is noted on examination.  Labs today + vectra  Discussed therapy; consider methotrexate  RTO 2-3 month

## 2020-01-29 NOTE — PROGRESS NOTES
Subjective   Santhosh Bonilla is a 29 y.o. male.     History of Present Illness       ubjective   Santhosh Bonilla is a 29 y.o. male.     History of Present Illness     Pt is a 29 yr old male who is here for follow up today for his seropositive rheumatoid arthritis (RA), carpal tunnel syndrome (CTS), vitamin D deficiency, and hypothyroidism. Pt was last seen in the office in October 2019 and is here for follow up today.  Labs in October showed low vitamin D at 26, ESR & CRP both normal, CBC unremarkable. After his last office visit he had carpal tunnel surgery to the right wrist; doing much better. Numbness/tingling is gone. Still has bilateral hand pain and stiffness.  July 2019: Vectra DA was 35     Last hand xray was in January 2019 & showed no evidence of inflammatory arthritis.      Current medications from Rheumatology include: plaquenil 400mg/d, etodolac 400 mg BID.    Stiffness lasts 2 hours, hands ache and swell. Hands seem to bother him the most. He will have pain in both knees and feet. Feels like his weight causes some of the pain.    ROS: denies fever/chills, no nasal or oral lesions. Denies N/V/D. NO CP or SOA, no skin rashes or PS. Has hypothyroidism and his thyroid med was recently adjusted by PCP. Brain fog is getting better since adjustment. The remainder of the review of systems reviewed and are (-).   He is taking gabapentin 300 TID per PCP for his pain. He has history of bulging disc & did see spine in the past. Tried injections and they did not help.     He is concerned about medications due to family planning and concerns with medication side effects. Is willing to try medications.     Devin hx: seropositive RA, on plaquenil 400 mg/d  Meloxicam, diclofenac did not help  On etodolac 400 mg BID  Discussed arava---> pt declines d/t concerns with side effects.           The following portions of the patient's history were reviewed and updated as appropriate: allergies, current medications,  past family history, past medical history, past social history, past surgical history and problem list.    Review of Systems  See HPI    Objective   Physical Exam   Constitutional: He is oriented to person, place, and time. He appears well-developed and well-nourished.   Obese   HENT:   Head: Normocephalic and atraumatic.   Eyes: Pupils are equal, round, and reactive to light. Conjunctivae are normal.   Cardiovascular: Normal rate and regular rhythm.   Pulmonary/Chest: Effort normal and breath sounds normal. He has no wheezes.   Musculoskeletal:   Decreased ROM over the lumbar spine, bilateral hips, knees, shoulders  & michaela MCPs    . He has mild squeeze tenderness noted bilaterally. Tenderness over the lumbar spine, michaela MCPs, PIPs, and michaela knees and ankles  Ankles are mildly swollen.   Neurological: He is alert and oriented to person, place, and time.   Skin: Skin is warm and dry.         Assessment/Plan   Santhosh was seen today for rheumatoid arthritis.    Diagnoses and all orders for this visit:    Class 3 severe obesity with body mass index (BMI) of 50.0 to 59.9 in adult, unspecified obesity type, unspecified whether serious comorbidity present (CMS/[facility])    Rheumatoid arthritis involving multiple sites with positive rheumatoid factor (CMS/[facility])  Comments:  May continue w/ plaquenil and etodolac.  Multiple tender joints on examination. Labs today + vectra DA  Orders:  -     CBC & Differential; Future  -     Comprehensive Metabolic Panel; Future  -     C-reactive Protein; Future  -     Cyclic Citrul Peptide Antibody, IgG / IgA; Future  -     Vitamin D 25 Hydroxy; Future  -     Vectra(R) DA Disease Activity; Future  -     etodolac (LODINE) 400 MG tablet; Take 1 tablet by mouth 2 (Two) Times a Day.  -     hydroxychloroquine (PLAQUENIL) 200 MG tablet; Take 1 tablet by mouth 2 (Two) Times a Day.  -     Vectra(R) DA Disease Activity    Vitamin D deficiency  Comments:  Check labs  Orders:  -     Vitamin D 25 Hydroxy;  Future    Carpal tunnel syndrome, bilateral  Comments:  S/P right carpal tunnel surgery in November 2019--claims he is doing well        Patient Instructions   Pt has multiple tender joints on examination today. No synovitis is noted on examination.  Labs today + summer  Discussed therapy; consider methotrexate  RTO 2-3 month

## 2021-05-12 ENCOUNTER — TRANSCRIBE ORDERS (OUTPATIENT)
Dept: BARIATRICS/WEIGHT MGMT | Facility: CLINIC | Age: 31
End: 2021-05-12

## 2021-05-12 DIAGNOSIS — E66.01 MORBID OBESITY (HCC): Primary | ICD-10-CM

## 2021-06-04 ENCOUNTER — TELEPHONE (OUTPATIENT)
Dept: BARIATRICS/WEIGHT MGMT | Facility: CLINIC | Age: 31
End: 2021-06-04

## 2021-07-01 ENCOUNTER — OFFICE VISIT (OUTPATIENT)
Dept: BARIATRICS/WEIGHT MGMT | Facility: CLINIC | Age: 31
End: 2021-07-01

## 2021-07-01 VITALS
OXYGEN SATURATION: 98 % | HEART RATE: 75 BPM | WEIGHT: 315 LBS | DIASTOLIC BLOOD PRESSURE: 82 MMHG | RESPIRATION RATE: 18 BRPM | HEIGHT: 74 IN | BODY MASS INDEX: 40.43 KG/M2 | TEMPERATURE: 97.5 F | SYSTOLIC BLOOD PRESSURE: 142 MMHG

## 2021-07-01 DIAGNOSIS — Z71.3 NUTRITIONAL COUNSELING: ICD-10-CM

## 2021-07-01 PROCEDURE — 99213 OFFICE O/P EST LOW 20 MIN: CPT | Performed by: NURSE PRACTITIONER

## 2021-07-01 RX ORDER — HYDRALAZINE HYDROCHLORIDE 10 MG/1
10 TABLET, FILM COATED ORAL 2 TIMES DAILY WITH MEALS
COMMUNITY
Start: 2021-05-18

## 2021-07-01 NOTE — PROGRESS NOTES
[facility] BARIATRIC SURGERY  [address] [place] [address]  [address] [place] [address]  Dept: [phone]  7/1/2021      Santhosh Bonilla.  [id number]  [id number]  1990  male      Chief Complaint   Patient presents with   • Nutrition Counseling     SWL #1/6       The patient is here for month 1 of their pre-operative physician supervised diet. He had a current weight of 504 pounds. The patient states that he is following the recommendations given by our office and dietician including a high lean protein, low carb and low fat diet. We recommended adequate fruits and vegetable intake along with limited portion sizes. Patient is working on eliminating fast foods, fried foods, sweets and soda. Santhosh Bonilla has been increasing his daily water intake. He has been exercising: walking at work  .    Patient states they have made positive changes including   The patient admits to be struggling with sweets PRN     Breakfast: none usually  Lunch: salad and peanuts or cashews, sometimes with chicken or pepperonis or eggs   Dinner: varies: fast food if on the go, wife cooks when at home, spaghetti or pot roast with veggies   Snacks: if any, jerky stick or peanuts , potatoe chips PRN   Drinks: protein shakes , water , diet dr. Calix 1 a day , energy drinks, water with flavor packets, sweet tea when eating out , G2  Exercise: golf, hiking, walking PRN , walking at work 10,000-20,000 steps a day               Review of Systems   Constitutional: Positive for fatigue.   Respiratory: Negative.    Cardiovascular: Negative.    Gastrointestinal: Negative.    Musculoskeletal: Positive for back pain.     Vitals:    07/01/21 1412   BP: 142/82   Pulse: 75   Resp: 18   Temp: 97.5 °F (36.4 °C)   SpO2: 98%     Patient Active Problem List   Diagnosis   • Carpal tunnel syndrome, bilateral   • Cellulitis   • Hypothyroidism   • Rheumatoid arthritis of hand  (CMS/[facility])   • Vitamin D deficiency     Body mass index is 64.33 kg/m².    The following portions of the patient's history were reviewed and updated as appropriate: active problem list, medication list, allergies, social history    Physical Exam  Constitutional:       Appearance: Normal appearance. He is obese.   Cardiovascular:      Rate and Rhythm: Normal rate and regular rhythm.   Pulmonary:      Effort: Pulmonary effort is normal.      Breath sounds: Normal breath sounds.   Abdominal:      General: Abdomen is flat. Bowel sounds are normal.      Palpations: Abdomen is soft.   Skin:     General: Skin is warm and dry.   Neurological:      General: No focal deficit present.      Mental Status: He is alert and oriented to person, place, and time.   Psychiatric:         Mood and Affect: Mood normal.         Behavior: Behavior normal.         Thought Content: Thought content normal.         Judgment: Judgment normal.         Discussion/Plan:    New goals:  Eating and drinking separately with 1 meal a day  Chew food 10-15 times a bite and eat slowly over 30 minutes  Work on decreasing carbonation by 1 per day by next visit  Eat something high protein for breakfast    Obesity/Morbid Obesity: Currently the patient's weight is 504 pounds.There are no medications prescribed.Treatment plan includes prescribed diet, prescribed exercise regimen and behavior modification.    I reviewed the appropriate dietary choices with the patient and encouraged the necessary changes. Recommended at least 70 grams of protein per day, around 35 grams of fats and less than 100 grams of carbohydrates. Reviewed calorie intake if patient wanted to calorie count and/or had BMR. Instructed patient to drink half of body weight in ounces per day and exercise a minimum of 150 minutes per week including both cardio and strength training. Discussed the option of keeping a food journal which will help patient become more aware of the nutritional value of  foods so they are more prepared after surgery.    The patient was given written materials from our office for education.   I answered all of the patients questions regarding dietary changes, exercise or surgical options.  The patient will follow up in 1 month. The total time spent face to face was 30  minutes with 25 minutes spent counseling.    Quin Rowe APRTRESA  [facility] Bariatrics and General Surgery

## 2021-08-31 ENCOUNTER — HOSPITAL ENCOUNTER (OUTPATIENT)
Dept: CARDIOLOGY | Facility: HOSPITAL | Age: 31
Discharge: HOME | End: 2021-08-31

## 2021-08-31 ENCOUNTER — CONSULT (OUTPATIENT)
Dept: BARIATRICS/WEIGHT MGMT | Facility: CLINIC | Age: 31
End: 2021-08-31

## 2021-08-31 ENCOUNTER — PREP FOR SURGERY (OUTPATIENT)
Dept: OTHER | Facility: HOSPITAL | Age: 31
End: 2021-08-31

## 2021-08-31 ENCOUNTER — LAB (OUTPATIENT)
Dept: LAB | Facility: HOSPITAL | Age: 31
End: 2021-08-31

## 2021-08-31 ENCOUNTER — HOSPITAL ENCOUNTER (OUTPATIENT)
Dept: GENERAL RADIOLOGY | Facility: HOSPITAL | Age: 31
Discharge: HOME | End: 2021-08-31

## 2021-08-31 VITALS
OXYGEN SATURATION: 99 % | BODY MASS INDEX: 40.43 KG/M2 | SYSTOLIC BLOOD PRESSURE: 136 MMHG | TEMPERATURE: 97.6 F | HEIGHT: 74 IN | DIASTOLIC BLOOD PRESSURE: 86 MMHG | RESPIRATION RATE: 16 BRPM | WEIGHT: 315 LBS | HEART RATE: 60 BPM

## 2021-08-31 DIAGNOSIS — Z01.818 PRE-OP EXAMINATION: ICD-10-CM

## 2021-08-31 DIAGNOSIS — Z71.3 NUTRITIONAL COUNSELING: ICD-10-CM

## 2021-08-31 LAB
25(OH)D3 SERPL-MCNC: 27.5 NG/ML (ref 30–100)
ALBUMIN SERPL-MCNC: 4.3 G/DL (ref 3.5–5.2)
ALBUMIN/GLOB SERPL: 1.8 G/DL
ALP SERPL-CCNC: 73 U/L (ref 39–117)
ALT SERPL W P-5'-P-CCNC: 33 U/L (ref 1–41)
AMPHET+METHAMPHET UR QL: NEGATIVE
ANION GAP SERPL CALCULATED.3IONS-SCNC: 7.8 MMOL/L (ref 5–15)
AST SERPL-CCNC: 23 U/L (ref 1–40)
BARBITURATES UR QL SCN: NEGATIVE
BASOPHILS # BLD AUTO: 0.06 10*3/MM3 (ref 0–0.2)
BASOPHILS NFR BLD AUTO: 0.8 % (ref 0–1.5)
BENZODIAZ UR QL SCN: NEGATIVE
BILIRUB SERPL-MCNC: 0.3 MG/DL (ref 0–1.2)
BUN SERPL-MCNC: 17 MG/DL (ref 6–20)
BUN/CREAT SERPL: 24.3 (ref 7–25)
CALCIUM SPEC-SCNC: 8.5 MG/DL (ref 8.6–10.5)
CANNABINOIDS SERPL QL: NEGATIVE
CHLORIDE SERPL-SCNC: 103 MMOL/L (ref 98–107)
CHOLEST SERPL-MCNC: 170 MG/DL (ref 0–200)
CO2 SERPL-SCNC: 28.2 MMOL/L (ref 22–29)
COCAINE UR QL: NEGATIVE
CREAT SERPL-MCNC: 0.7 MG/DL (ref 0.76–1.27)
DEPRECATED RDW RBC AUTO: 44.8 FL (ref 37–54)
EOSINOPHIL # BLD AUTO: 0.24 10*3/MM3 (ref 0–0.4)
EOSINOPHIL NFR BLD AUTO: 3.3 % (ref 0.3–6.2)
ERYTHROCYTE [DISTWIDTH] IN BLOOD BY AUTOMATED COUNT: 14.3 % (ref 12.3–15.4)
GFR SERPL CREATININE-BSD FRML MDRD: 131 ML/MIN/1.73
GLOBULIN UR ELPH-MCNC: 2.4 GM/DL
GLUCOSE SERPL-MCNC: 108 MG/DL (ref 65–99)
HBA1C MFR BLD: 5.6 % (ref 3.5–5.6)
HCT VFR BLD AUTO: 44.1 % (ref 37.5–51)
HDLC SERPL-MCNC: 40 MG/DL (ref 40–60)
HGB BLD-MCNC: 14.6 G/DL (ref 13–17.7)
IMM GRANULOCYTES # BLD AUTO: 0.04 10*3/MM3 (ref 0–0.05)
IMM GRANULOCYTES NFR BLD AUTO: 0.5 % (ref 0–0.5)
LDLC SERPL CALC-MCNC: 100 MG/DL (ref 0–100)
LDLC/HDLC SERPL: 2.39 {RATIO}
LYMPHOCYTES # BLD AUTO: 2.45 10*3/MM3 (ref 0.7–3.1)
LYMPHOCYTES NFR BLD AUTO: 33.6 % (ref 19.6–45.3)
MCH RBC QN AUTO: 28.4 PG (ref 26.6–33)
MCHC RBC AUTO-ENTMCNC: 33.1 G/DL (ref 31.5–35.7)
MCV RBC AUTO: 85.8 FL (ref 79–97)
METHADONE UR QL SCN: NEGATIVE
MONOCYTES # BLD AUTO: 0.57 10*3/MM3 (ref 0.1–0.9)
MONOCYTES NFR BLD AUTO: 7.8 % (ref 5–12)
NEUTROPHILS NFR BLD AUTO: 3.94 10*3/MM3 (ref 1.7–7)
NEUTROPHILS NFR BLD AUTO: 54 % (ref 42.7–76)
NRBC BLD AUTO-RTO: 0 /100 WBC (ref 0–0.2)
OPIATES UR QL: NEGATIVE
OXYCODONE UR QL SCN: NEGATIVE
PLATELET # BLD AUTO: 190 10*3/MM3 (ref 140–450)
PMV BLD AUTO: 12.2 FL (ref 6–12)
POTASSIUM SERPL-SCNC: 4.5 MMOL/L (ref 3.5–5.2)
PROT SERPL-MCNC: 6.7 G/DL (ref 6–8.5)
QT INTERVAL: 424 MS
RBC # BLD AUTO: 5.14 10*6/MM3 (ref 4.14–5.8)
SODIUM SERPL-SCNC: 139 MMOL/L (ref 136–145)
TRIGL SERPL-MCNC: 173 MG/DL (ref 0–150)
TSH SERPL DL<=0.05 MIU/L-ACNC: 0.19 UIU/ML (ref 0.27–4.2)
VLDLC SERPL-MCNC: 30 MG/DL (ref 5–40)
WBC # BLD AUTO: 7.3 10*3/MM3 (ref 3.4–10.8)

## 2021-08-31 PROCEDURE — 93005 ELECTROCARDIOGRAM TRACING: CPT | Performed by: NURSE PRACTITIONER

## 2021-08-31 PROCEDURE — 36415 COLL VENOUS BLD VENIPUNCTURE: CPT

## 2021-08-31 PROCEDURE — G0480 DRUG TEST DEF 1-7 CLASSES: HCPCS

## 2021-08-31 PROCEDURE — 80307 DRUG TEST PRSMV CHEM ANLYZR: CPT

## 2021-08-31 PROCEDURE — 82306 VITAMIN D 25 HYDROXY: CPT

## 2021-08-31 PROCEDURE — 71046 X-RAY EXAM CHEST 2 VIEWS: CPT

## 2021-08-31 PROCEDURE — 80053 COMPREHEN METABOLIC PANEL: CPT

## 2021-08-31 PROCEDURE — 83036 HEMOGLOBIN GLYCOSYLATED A1C: CPT

## 2021-08-31 PROCEDURE — 84425 ASSAY OF VITAMIN B-1: CPT

## 2021-08-31 PROCEDURE — 99215 OFFICE O/P EST HI 40 MIN: CPT | Performed by: NURSE PRACTITIONER

## 2021-08-31 PROCEDURE — 80061 LIPID PANEL: CPT

## 2021-08-31 PROCEDURE — 93010 ELECTROCARDIOGRAM REPORT: CPT | Performed by: INTERNAL MEDICINE

## 2021-08-31 PROCEDURE — 85025 COMPLETE CBC W/AUTO DIFF WBC: CPT

## 2021-08-31 PROCEDURE — 84443 ASSAY THYROID STIM HORMONE: CPT

## 2021-08-31 RX ORDER — ETODOLAC 500 MG/1
500 TABLET, FILM COATED ORAL 2 TIMES DAILY
COMMUNITY
Start: 2021-08-27

## 2021-08-31 RX ORDER — SODIUM CHLORIDE 9 MG/ML
30 INJECTION, SOLUTION INTRAVENOUS CONTINUOUS PRN
Status: CANCELLED | OUTPATIENT
Start: 2021-08-31

## 2021-08-31 NOTE — PROGRESS NOTES
[facility] BARIATRIC SURGERY  [address] [place] [address]  [address] [place] [address]  Dept: [phone]  8/31/2021      Santhosh Bonilla.  [id number]  [id number]  1990  male      Chief Complaint of weight gain; unable to maintain weight loss    History of Present Illness:   Santhosh is a 30 y.o. male who presents today for evaluation, education and consultation regarding weight loss surgery. The patient is interested in the sleeve gastrectomy.      Diet History:See dietician/RN/MA documentation for complete history of weight and diet.     Bariatric Surgery Evaluation: The patient is being seen for an initial visit for bariatric surgery evaluation.     Breakfast: 2-3 days a week eats breakfast, protein shake/ banana   Lunch: ham/ turkey and ritz crackers, or spinach wrap or chicken salad with greek yogurt and pecans/ protein shake  Dinner: eats large portion size, chicken breast, pork chops, green beans, mashed potatoes   Snacks: trail mix, beef jerky stick, chips PRN  Drinks: soda 1 bottle a day, water, protein shake  Exercise: walking at work, torn tendon in foot - is currently in a walking boot for 4 more weeks    Past goals:  Eating and drinking separately with 1 meal a day- parietally met  Chew food 10-15 times a bite and eat slowly over 30 minutes- partially met   Work on decreasing carbonation by 1 per day by next visit- met  Eat something high protein for breakfast- partially met 50% time    Patient Active Problem List   Diagnosis   • Carpal tunnel syndrome, bilateral   • Cellulitis   • Hypothyroidism   • Rheumatoid arthritis of hand (CMS/HCC)   • Vitamin D deficiency   • Body mass index (BMI) of 60.0-69.9 in adult (CMS/HCC)   sleep apnea, thyroid cancer in 2014- thyroidectomy     Past Medical History:   Diagnosis Date   • Carpal tunnel syndrome of right wrist    • Hypertension    • Hypothyroid    • Morbid obesity  (CMS/HCC)    • SUSAN (obstructive sleep apnea)    • Papillary carcinoma (CMS/HCC)        Past Surgical History:   Procedure Laterality Date   • LYMPHADENECTOMY     • TENDON REPAIR Left     Hand   • THYROIDECTOMY      due to papillari carcinoma   • TONSILLECTOMY     • UMBILICAL HERNIA REPAIR         Allergies   Allergen Reactions   • Contrast Dye Hives   • Penicillins Anaphylaxis   • Iodine Other (See Comments)         Current Outpatient Medications:   •  atenolol (TENORMIN) 100 MG tablet, ATENOLOL 100 MG TABS, Disp: , Rfl:   •  etodolac (LODINE) 500 MG tablet, Take 500 mg by mouth 2 (Two) Times a Day., Disp: , Rfl:   •  hydrALAZINE (APRESOLINE) 10 MG tablet, Take 10 mg by mouth 2 (Two) Times a Day With Meals. Patient taking once daily, Disp: , Rfl:   •  levothyroxine (SYNTHROID, LEVOTHROID) 25 MCG tablet, Take 25 mcg by mouth Daily., Disp: , Rfl: 1  •  levothyroxine (SYNTHROID, LEVOTHROID) 300 MCG tablet, LEVOTHYROXINE SODIUM 300 MCG TABS, Disp: , Rfl:   •  liothyronine (CYTOMEL) 25 MCG tablet, LIOTHYRONINE SODIUM 25 MCG TABS, Disp: , Rfl:     Social History     Socioeconomic History   • Marital status:      Spouse name: Not on file   • Number of children: Not on file   • Years of education: Not on file   • Highest education level: Not on file   Tobacco Use   • Smoking status: Former Smoker     Types: Cigarettes     Quit date:      Years since quittin.6   • Smokeless tobacco: Current User     Types: Chew   Substance and Sexual Activity   • Alcohol use: Yes     Alcohol/week: 1.0 standard drinks     Types: 1 Cans of beer per week     Comment: occasionally   • Drug use: No   • Sexual activity: Yes     Partners: Female       Family History   Problem Relation Age of Onset   • Arthritis Mother    • Bleeding Disorder Mother    • COPD Mother    • Sleep apnea Mother    • Hypertension Father    • Hypertension Brother    • Obesity Maternal Grandmother    • Diabetes Maternal Grandmother          Review of  Systems:  Review of Systems   Constitutional:        Weight gain, fatigue    HENT:        Wears glasses/contact   Respiratory:        Sleep apnea- has CPAP but doesn't use, snoring   Cardiovascular:        HTN, shortness of breath on exertion, cramping in legs when walking, ankle swelling   Gastrointestinal:        GERD   Endocrine:        Hx thyroid cancer, thyroidectomy, hypothyroid    Genitourinary: Negative.    Musculoskeletal:        Shoulder, hip, foot, neck, knee, heel, back, ankle pain, RA, myalgia, plantar fasciitis,    Skin:        Rashes under  skin folds   Neurological: Positive for numbness.   Hematological: Negative.    Psychiatric/Behavioral: Negative.        Physical Exam:  Vital Signs:  Weight: (!) 232 kg (511 lb)   Body mass index is 65.17 kg/m².  Temp: 97.6 °F (36.4 °C)   Heart Rate: 60   BP: 136/86     Physical Exam  Constitutional:       Appearance: Normal appearance. He is obese.   Cardiovascular:      Rate and Rhythm: Normal rate and regular rhythm.   Pulmonary:      Effort: Pulmonary effort is normal.      Breath sounds: Normal breath sounds.   Abdominal:      General: Abdomen is flat. Bowel sounds are normal.      Palpations: Abdomen is soft.   Skin:     General: Skin is warm and dry.   Neurological:      General: No focal deficit present.      Mental Status: He is alert and oriented to person, place, and time.   Psychiatric:         Mood and Affect: Mood normal.         Behavior: Behavior normal.         Thought Content: Thought content normal.         Judgment: Judgment normal.            Assessment:         New goals:  Drink a protein shake for breakfast  Eating and drinking separately with 1 meal a day  Chew food 10-15 times a bite and eat slowly over 30 minutes    Santhosh Bonilla is a 30 y.o. year old male with medically complicated severe obesity. Weight: (!) 232 kg (511 lb), Body mass index is 65.17 kg/m². and weight related problems.    I explained in detail the procedures that  we are performing.  All of those procedures can be performed laparoscopically but there is a chance to convert to open if any technical challenges or complications do occur.  Bariatric surgery is not cosmetic surgery but rather a tool to help a patient make a life-long commitment lifestyle changes including diet, exercise, behavior changes, and taking supplemental vitamins and minerals.    Due to the patient's BMI and co-morbidities they are at a high risk for surgery and will obtain the following:  The patient has been advised that a letter of medical support and a history and physical must be obtained from his primary care physician. A psychological evaluation will be arranged for this patient. CBC, CMP, FLP, TSH and HgbA1C will be drawn. Santhosh Bonilla will obtain a pre-operative CXR and EKG. Santhosh Bonilla will obtain clearance from a cardiologist prior to surgery.     Santhosh Bonilla will be set up for a pre-operative diagnostic esophagogastroduodenoscopy with biopsy for evaluation. The risks and benefits of the procedure were discussed with the patient in detail and all questions were answered.  Possibility of perforation, bleeding, aspiration, anoxic brain injury, respiratory and/or cardiac arrest and death were discussed.   He received handouts regarding, all questions were answered and informed consent was obtained.     The risks, benefits, alternatives, and potential complications of all of the procedures were explained in detail including, but not limited to death, anesthesia and medication adverse effect/DVT, pulmonary embolism, trocar site/incisional hernia, wound infection, abdominal infection, bleeding, failure to lose weight or gain weight and change in body image, metabolic complications with calcium, thiamine, vitamin B12, folate, iron, and anemia.    The patient was advised to start a high protein, low fat and low carbohydrate diet. The patient was given individualized information by  our dietician along with general group information and handouts.       The patient was encouraged to start routine exercise including but not limited to 150 minutes per week. The patient received a resistance band along with a handout of exercises.     The consultation plan was reviewed with the patient.    The patient understands the surgical procedures and the different surgical options that are available.  He understands the lifestyle changes that would be required after surgery and has agreed to participate in a pre-operative and postoperative weight management program.  He also expressed understanding of possible risks, had several questions answered and desires to proceed.    I think he is a good candidate for this surgery, and is interested in a sleeve gastrectomy.    Encounter Diagnosis   Name Primary?   • Body mass index (BMI) of 60.0-69.9 in adult (CMS/[facility]) Yes       Plan:    Patient will have evaluations and follow up with bariatric dieticians and a psychologist before undergoing a multidisciplinary review of his candidacy.  We also discussed the weight loss requirement and rationale, and other program requirements.    Pt will need an EGD prior to surgery. He will need psychiatric clearance prior to bariatric surgery through [facility] psychology. He will also need cardiac clearance as well prior to bariatric surgery. Plan to follow up in 1 month for next L visit.    Total time spent with patient 60 minutes of which 45 minutes were spent on education.       Quin Rowe, APRN  8/31/2021

## 2021-09-01 LAB
COTININE UR QL SCN: POSITIVE NG/ML
Lab: ABNORMAL

## 2021-09-02 DIAGNOSIS — F17.200 SMOKING: Primary | ICD-10-CM

## 2021-09-02 LAB — VIT B1 BLD-SCNC: 145.2 NMOL/L (ref 66.5–200)

## 2021-09-10 ENCOUNTER — TELEPHONE (OUTPATIENT)
Dept: BARIATRICS/WEIGHT MGMT | Facility: CLINIC | Age: 31
End: 2021-09-10

## 2021-09-10 NOTE — TELEPHONE ENCOUNTER
Patient called stating he received a voicemail to call regarding his lab results. I gave him the information Quin had put on the notes on the labs. He verbalized understanding. He is asking when should he repeat the nicotine screen?

## 2021-09-13 NOTE — TELEPHONE ENCOUNTER
Left patient VM that he should have a repeat screen 2-4 weeks after stopping all nicotine containing products, cigarettes, patches, gum, etc. 9/13/21 MN

## 2021-09-17 ENCOUNTER — TELEMEDICINE (OUTPATIENT)
Dept: BARIATRICS/WEIGHT MGMT | Facility: CLINIC | Age: 31
End: 2021-09-17

## 2021-09-17 VITALS — WEIGHT: 315 LBS | BODY MASS INDEX: 64.4 KG/M2

## 2021-09-17 PROCEDURE — 97803 MED NUTRITION INDIV SUBSEQ: CPT | Performed by: DIETITIAN, REGISTERED

## 2021-09-17 NOTE — PROGRESS NOTES
The patient is here for supervised weight loss visit. Has partially met following goals: 1) pt drinking protein shake; 2.) pt states he is still struggling with not drinking with meals; 3.) pt states he is eating slower and chewing food more and taking 30 minutes to eat meals.    PA: still in walking boot; lifting weights one hour every day and alternating legs and arms     24 hr recall:  Breakfast: protein shake  Lunch: protein shake or wrap  Dinner: chicken breast, pork chop, green beans and mashed potatoes  Snacks: protein shake, beef jerky  Drinks: water and less soda; etoh 6 beers on weekend     Review of Systems   HR NA    BP NA   Patient Active Problem List   Diagnosis   • Obesity         Vitals   Wt: 505 pounds     Weight change from last appointment: -6 lb     Discussion/Plan:  Obesity/Morbid Obesity: I reviewed the appropriate dietary choices with the patient and encouraged the necessary changes. Recommended at least 60 grams of protein per day, around 33 grams of fats and less than 100 grams of carbohydrates. Reviewed calorie intake if patient wanted to calorie count and/or had BMR. Instructed patient to drink 64 ounces of water per day and exercise a minimum of 150 minutes per week including both cardio and strength training.  Discussed with pt also eating and drinking separately stopping 30 minutes before meals and 45 minutes after meals. Discussed the option of keeping a food journal which will help patient become more aware of the nutritional value of foods so they are more prepared after surgery.     The patient was given written materials from our office for education.     I answered all of the patients questions regarding dietary changes, exercise or surgical options.     The patient will follow up in one month on October 22.      PES: Food and nutrition related knowledge deficit RT uncertainty how to apply nutrition information AEB needed education for guidelines for bariatric surgery.      Pt  agreed to participate in video visit.  Spent 15 minutes with patient.          Goals:  1.) Continue to work on not drinking with meals. Don't put liquids on the table.  2.) Avoid sodas and drink more water. Keep beer to one on weekend  3.) Lose 3-6 pounds by next visit.

## 2021-09-28 ENCOUNTER — TELEPHONE (OUTPATIENT)
Dept: BARIATRICS/WEIGHT MGMT | Facility: CLINIC | Age: 31
End: 2021-09-28

## 2021-09-28 NOTE — TELEPHONE ENCOUNTER
Called to see about getting him scheduled for an appt with [facility], he has the number and knows to call them to set up

## 2021-10-22 ENCOUNTER — TELEMEDICINE (OUTPATIENT)
Dept: BARIATRICS/WEIGHT MGMT | Facility: CLINIC | Age: 31
End: 2021-10-22

## 2021-10-22 VITALS — WEIGHT: 315 LBS | BODY MASS INDEX: 61.72 KG/M2

## 2021-10-22 PROCEDURE — 97803 MED NUTRITION INDIV SUBSEQ: CPT | Performed by: DIETITIAN, REGISTERED

## 2021-10-22 NOTE — PROGRESS NOTES
The patient is here for supervised weight loss visit. Has partially met following goals: 1.) waiting until after meals to drink; 2.) no sodas the past month; 3.) lost 23 pounds      PA: lifting weights 45-60 minutes daily alternating arms, legs, crunches and step ups     24 hr recall:  Breakfast: protein shake  Lunch: skips or 2 chicken pouches with banana ritz crackers, almonds and beef jerky  Dinner: unbreaded buffalo chicken strips, 7 oz corn and 2 oz Ranch dressing  Snacks: beef jerky  Drinks: water, cold brew with protein shake     Review of Systems   HR NA    BP NA   Patient Active Problem List   Diagnosis   • Obesity         Vitals   Wt: 484 pounds     Weight change from last appointment:-23 lb     Discussion/Plan:  Obesity/Morbid Obesity: I reviewed the appropriate dietary choices with the patient and encouraged the necessary changes. Recommended at least 60 grams of protein per day, around 33 grams of fats and less than 100 grams of carbohydrates. Reviewed calorie intake if patient wanted to calorie count and/or had BMR. Instructed patient to drink 64 ounces of water per day and exercise a minimum of 150 minutes per week including both cardio and strength training.  Discussed with pt also eating and drinking separately stopping 30 minutes before meals and 45 minutes after meals. Discussed the option of keeping a food journal which will help patient become more aware of the nutritional value of foods so they are more prepared after surgery.     The patient was given written materials from our office for education.     I answered all of the patients questions regarding dietary changes, exercise or surgical options.     The patient will follow up in one month on 11/19/21.     PES: Food and nutrition related knowledge deficit RT uncertainty how to apply nutrition information AEB needed education for guidelines for bariatric surgery.      The total time spent face to face was 15 minutes with 15 minutes spent  counseling. Pt was in-person.  This was not a video visit.         Goals:  1.) Eat every 4-6 hours. Set alarms/alerts on smart phone as reminders.

## 2021-11-01 ENCOUNTER — TELEPHONE (OUTPATIENT)
Dept: BARIATRICS/WEIGHT MGMT | Facility: CLINIC | Age: 31
End: 2021-11-01

## 2021-11-01 NOTE — TELEPHONE ENCOUNTER
Tried calling patient to see if he was able to contact David Stoner to set up the psych eval, but no answer and voicemail is full.

## 2021-11-19 ENCOUNTER — TELEMEDICINE (OUTPATIENT)
Dept: BARIATRICS/WEIGHT MGMT | Facility: CLINIC | Age: 31
End: 2021-11-19

## 2021-11-19 DIAGNOSIS — E66.01 OBESITY, CLASS III, BMI 40-49.9 (MORBID OBESITY) (HCC): Primary | ICD-10-CM

## 2021-11-19 PROCEDURE — 97803 MED NUTRITION INDIV SUBSEQ: CPT | Performed by: DIETITIAN, REGISTERED

## 2021-11-19 NOTE — PROGRESS NOTES
The patient is here for supervised weight loss visit. Has partially met his previous  goals:     PA: weightlifting 3-6 days per week, walking at work     24 hr recall:  Breakfast:  Protein shake  Lunch: chicken packets x 2, crackers, jerky stick or almonds, curies oranges  Dinner: eating out 6-7 days per week (mcdonalds, wendys, taco bell), spaghetti  Snacks: almonds, beef jerky  Drinks:  Occasional diet soda (1 per week), water, sugar free monster      Review of Systems   HR    BP   Patient Active Problem List   Diagnosis   • Obesity         Vitals   Wt: 467 lb     Weight change from last appointment: -3 lb     Discussion/Plan:  Obesity/Morbid Obesity: I reviewed the appropriate dietary choices with the patient and encouraged the necessary changes. Recommended at least 60 grams of protein per day, around 33 grams of fats and less than 100 grams of carbohydrates. Reviewed calorie intake if patient wanted to calorie count and/or had BMR. Instructed patient to drink 64 ounces of water per day and exercise a minimum of 150 minutes per week including both cardio and strength training.  Discussed with pt also eating and drinking separately stopping 30 minutes before meals and 45 minutes after meals. Discussed the option of keeping a food journal which will help patient become more aware of the nutritional value of foods so they are more prepared after surgery.     The patient was given written materials from our office for education.     I answered all of the patients questions regarding dietary changes, exercise or surgical options.     The patient will follow up in one month in December     PES: Food and nutrition related knowledge deficit RT uncertainty how to apply nutrition information AEB needed education for guidelines for bariatric surgery.      The total time spent face to face was 15  minutes with 15  minutes spent counseling.            Goals:  1.) Eat every 4-6 hours. Set alarms/alerts on smart phone as  reminders.  2) Stop soda consumption - water only

## 2021-12-17 ENCOUNTER — TELEMEDICINE (OUTPATIENT)
Dept: BARIATRICS/WEIGHT MGMT | Facility: CLINIC | Age: 31
End: 2021-12-17
Payer: COMMERCIAL

## 2021-12-17 DIAGNOSIS — E66.01 OBESITY, CLASS III, BMI 40-49.9 (MORBID OBESITY): Primary | ICD-10-CM

## 2021-12-17 NOTE — PROGRESS NOTES
You have chosen to receive care through a video visit Do you connsent to use a video visit for you medical care today? Yes    The patient is here for supervised weight loss visit. Has partially met his previous goals:     PA: weightlifting 45-60 min 3-6 days per week, walking at work      24 hr recall:  Breakfast:  Protein shake (premier protein), sometimes banana  Lunch: chicken, crackers, pb crackers, protein shake  Dinner: varies; [employer], chicken, spaghetti, green beans, corn, peas  Snacks: almonds, jerky sticks  Drinks: flavored water, SF Monster, SF [employer]star     Review of Systems   HR ---    BP ---   Patient Active Problem List   Diagnosis   • Obesity         Vitals   Wt: 465     Weight change from last appointment: -5 lb     Discussion/Plan:  Obesity/Morbid Obesity: I reviewed the appropriate dietary choices with the patient and encouraged the necessary changes. Recommended at least 60 grams of protein per day, around 33 grams of fats and less than 100 grams of carbohydrates. Reviewed calorie intake if patient wanted to calorie count and/or had BMR. Instructed patient to drink 64 ounces of water per day and exercise a minimum of 150 minutes per week including both cardio and strength training.  Discussed with pt also eating and drinking separately stopping 30 minutes before meals and 45 minutes after meals. Discussed the option of keeping a food journal which will help patient become more aware of the nutritional value of foods so they are more prepared after surgery.     The patient was given written materials from our office for education.     I answered all of the patients questions regarding dietary changes, exercise or surgical options.        PES: Food and nutrition related knowledge deficit RT uncertainty how to apply nutrition information AEB needed education for guidelines for bariatric surgery.      The total time spent face to face was 15 minutes with 15 minutes spent counseling.             Goals:  1.) Eat every 4-6 hours. Set alarms/alerts on smart phone as reminders.  2) Stop soda consumption - water only  3) lose 10 lbs by 1/17/22

## 2021-12-20 ENCOUNTER — TELEPHONE (OUTPATIENT)
Dept: BARIATRICS/WEIGHT MGMT | Facility: CLINIC | Age: 31
End: 2021-12-20

## 2021-12-20 NOTE — TELEPHONE ENCOUNTER
Tried calling Santhosh to discuss his process with the program. He finished his SWL last week with Arjun. No answer and voicemail is full.    Santhosh called back. States he hasn't made his appointments with [facility] or [facility] yet, due to work has been crazy, but that they are shut down now so he plans on next week, at least calling the places to get the appointments scheduled. States he forgot about the UNS and still chews. States he knows he has to be nicotine free for 30 days though. EGD is scheduled for 1.19.2022. Santhosh is aware that we can't submit to insurance for approval until we have everything completed.

## 2022-01-17 ENCOUNTER — APPOINTMENT (OUTPATIENT)
Dept: LAB | Facility: HOSPITAL | Age: 32
End: 2022-01-17

## 2022-01-17 ENCOUNTER — LAB (OUTPATIENT)
Dept: LAB | Facility: HOSPITAL | Age: 32
End: 2022-01-17

## 2022-01-17 PROCEDURE — C9803 HOPD COVID-19 SPEC COLLECT: HCPCS

## 2022-01-17 PROCEDURE — U0004 COV-19 TEST NON-CDC HGH THRU: HCPCS

## 2022-01-18 ENCOUNTER — TELEPHONE (OUTPATIENT)
Dept: BARIATRICS/WEIGHT MGMT | Facility: CLINIC | Age: 32
End: 2022-01-18

## 2022-01-18 LAB — SARS-COV-2 ORF1AB RESP QL NAA+PROBE: DETECTED

## 2022-01-18 NOTE — TELEPHONE ENCOUNTER
Called to let him know he tested positive for Covid, we will have to cx his EGD scheduled 1/19/22. He is currently having symptoms and knows to call us when he is no longer having symptoms, and we can schedule him out at least 10 days.